# Patient Record
Sex: MALE | Race: WHITE
[De-identification: names, ages, dates, MRNs, and addresses within clinical notes are randomized per-mention and may not be internally consistent; named-entity substitution may affect disease eponyms.]

---

## 2019-08-28 ENCOUNTER — HOSPITAL ENCOUNTER (EMERGENCY)
Dept: HOSPITAL 59 - ER | Age: 39
Discharge: HOME | End: 2019-08-28
Payer: COMMERCIAL

## 2019-08-28 DIAGNOSIS — M54.13: Primary | ICD-10-CM

## 2019-08-28 PROCEDURE — 72072 X-RAY EXAM THORAC SPINE 3VWS: CPT

## 2019-08-28 PROCEDURE — 96372 THER/PROPH/DIAG INJ SC/IM: CPT

## 2019-08-28 PROCEDURE — 99284 EMERGENCY DEPT VISIT MOD MDM: CPT

## 2019-08-28 PROCEDURE — 72040 X-RAY EXAM NECK SPINE 2-3 VW: CPT

## 2019-08-28 NOTE — EMERGENCY DEPARTMENT RECORD
History of Present Illness





- General


Chief Complaint: Back Pain/Injury


Stated Complaint: BACK PAIN LT SHOULDER BLADE,LT HAND NUM/LT TOE NUM


Time Seen by Provider: 08/28/19 21:41


Source: Patient


Mode of Arrival: Ambulatory


Limitations: No limitations





- History of Present Illness


Initial Comments: 





pt has pain in his l upper back for 3 days along the scapula border.  today he 

is getting numbness in his left 4th and 5th fingers. he denies injury but does 

do a lot of heavy lifting at work.


MD Complaint: Back pain


Onset/Timing: 3


-: Days(s)


Similar Symptoms Previously: No


Severity: Mild


Consistency: Constant


Improves With: None


Worsens With: Movement


Associated Symptoms: Numbness





- Related Data


                                  Previous Rx's











 Medication  Instructions  Recorded


 


Albuterol Sulfate [Proair Hfa] 1 - 2 puff IH .EVERY 4-6 HOURS PRN 12/09/18





 #1 inhaler 


 


Cyclobenzaprine HCl [Flexeril] 10 mg PO TID #14 tablet 08/28/19


 


Ibuprofen [Motrin] 800 mg PO Q8H PRN #20 tab 08/28/19











                                    Allergies











Allergy/AdvReac Type Severity Reaction Status Date / Time


 


Egg Derived Allergy  HYPERSENSIT Verified 08/28/19 21:37





   IVITY  


 


lactase [From Dairy Aid] Allergy  HYPERSENSIT Verified 08/28/19 21:37





   IVITY  


 


red dye Allergy  HYPERSENSIT Verified 08/28/19 21:37





   IVITY  


 


gluten AdvReac  ABDOMINAL Verified 08/28/19 21:37





   PAIN  














Travel Screening





- Travel/Exposure Within Last 30 Days


Have you traveled within the last 30 days?: No





- Travel/Exposure Within Last Year


Have you traveled outside the [place] in the last year?: No





- Additonal Travel Details


Have you been exposed to anyone with a communicable illness?: No





- Travel Symptoms


Symptom Screening: None





Review of Systems


Reviewed: No additional complaints except as noted below


Constitutional: Reports: As per HPI.  Denies: Chills, Fever, Malaise, Night 

sweats, Weakness, Weight change


Eyes: Reports: As per HPI.  Denies: Eye discharge, Eye pain, Photophobia, Vision

change


ENT: Reports: As per HPI.  Denies: Congestion, Dental pain, Ear pain, Epistaxis,

Hearing loss, Throat pain


Respiratory: Reports: As per HPI.  Denies: Cough, Dyspnea, Hemoptysis, Stridor, 

Wheezes


Cardiovascular: Reports: As per HPI.  Denies: Arrhythmia, Chest pain, Dyspnea on

exertion, Edema, Murmurs, Orthopnea, Palpitations, Paroxysmal nocturnal dyspnea,

Rheumatic Fever, Syncope


Endocrine: Reports: As per HPI.  Denies: Fatigue, Heat or cold intolerance, 

Polydipsia, Polyuria


Gastrointestinal: Reports: As per HPI.  Denies: Abdominal pain, Constipation, 

Diarrhea, Hematemesis, Hematochezia, Melena, Nausea, Vomiting


Genitourinary: Reports: As per HPI.  Denies: Dysuria, Frequency, Hematuria, 

Incontinence, Retention, Testicular pain, Testicular mass, Urgency


Musculoskeletal: Reports: As per HPI, Back pain.  Denies: Arthralgia, Gout, 

Joint swelling, Myalgia, Neck pain


Skin: Reports: As per HPI.  Denies: Bruising, Change in color, Change in 

hair/nails, Lesions, Pruritus, Rash


Neurological: Reports: As per HPI.  Denies: Abnormal gait, Confusion, Headache, 

Numbness, Paresthesias, Seizure, Tingling, Tremors, Vertigo, Weakness


Psychiatric: Reports: As per HPI.  Denies: Anxiety, Auditory hallucinations, 

Depression, Homicidal thoughts, Suicidal thoughts, Visual hallucinations


Hematological/Lymphatic: Reports: As per HPI.  Denies: Anemia, Blood Clots, Easy

bleeding, Easy bruising, Swollen glands





Past Medical History





- SOCIAL HISTORY


Smoking Status: Never smoker


Alcohol Use: None


Drug Use: None





- RESPIRATORY


Hx Respiratory Disorders: Yes


Hx Asthma: Yes





- CARDIOVASCULAR


Hx Cardio Disorders: No





- NEURO


Hx Neuro Disorders: No





- GI


Hx GI Disorders: No





- 


Hx Genitourinary Disorders: No





- ENDOCRINE


Hx Endocrine Disorders: No





- MUSCULOSKELETAL


Hx Musculoskeletal Disorders: No





- PSYCH


Hx Psych Problems: No





- HEMATOLOGY/ONCOLOGY


Hx Hematology/Oncology Disorders: No





Family Medical History


Any Significant Family History?: No


Hx Heart Disease: Father





Physical Exam





- General


General Appearance: Alert, Oriented x3, Cooperative, Mild distress





- Head


Head exam: Normal inspection





- Eye


Eye exam: Normal appearance, PERRL, EOMI


Pupils: Normal accommodation





- ENT


ENT exam: Normal exam, Mucous membranes moist, Normal external ear exam, Normal 

orophraynx


Ear exam: Normal external inspection.  negative: External canal tenderness


Nasal Exam: Normal inspection.  negative: Discharge, Sinus tenderness


Mouth exam: Normal external inspection, Tongue normal


Teeth exam: Normal inspection.  negative: Dental caries


Throat exam: Normal inspection.  negative: Tonsillar erythema, Tonsillar exudate





- Neck


Neck exam: Normal inspection, Full ROM.  negative: Tenderness





- Respiratory


Respiratory exam: Normal lung sounds bilaterally.  negative: Respiratory 

distress





- Cardiovascular


Cardiovascular Exam: Regular rate, Normal rhythm, Normal heart sounds





- GI/Abdominal


GI/Abdominal exam: Soft, Normal bowel sounds.  negative: Tenderness





- Rectal


Rectal exam: Deferred





- 


 exam: Deferred





- Extremities


Extremities exam: Normal inspection, Full ROM, Normal capillary refill.  

negative: Tenderness





- Back


Back exam: Reports: Normal inspection, Full ROM, Muscle spasm, Paraspinal 

tenderness, Tenderness.  Denies: Rash noted





- Neurological


Neurological exam: Alert, Normal gait, Oriented X3, Reflexes normal





- Psychiatric


Psychiatric exam: Normal affect, Normal mood





- Skin


Skin exam: Dry, Intact, Normal color, Warm





Course





                                   Vital Signs











  08/28/19





  21:29


 


Temperature 97.7 F


 


Pulse Rate [ 72





Pulse Ox Probe] 


 


Respiratory 20





Rate 


 


Blood Pressure 137/92





[Left Arm] 


 


Pulse Ox 99














- Reevaluation(s)


Reevaluation #1: 





08/28/19 23:11


pt still having pain but not as bad.





Disposition


Disposition: Discharge


Clinical Impression: 


Radiculopathy


Qualifiers:


 Spinal region: cervicothoracic Qualified Code(s): M54.13 - Radiculopathy, 

cervicothoracic region





Disposition: Home, Self-Care


Condition: (1) Good


Instructions:  Cervical Radiculopathy (ED)


Additional Instructions: 


follow up with family doctor.  return sooner if worse.  if symptoms continue 

have an mri. no lifting more then 5 pounds for 5 days


Prescriptions: 


Cyclobenzaprine HCl [Flexeril] 10 mg PO TID #14 tablet


Ibuprofen [Motrin] 800 mg PO Q8H PRN #20 tab


 PRN Reason: Pain - Moderate (5-7)


Forms:  Patient Portal Access, Return to Work/School





Quality





- Quality Measures


Quality Measures: N/A





- Blood Pressure Screening


Does Patient Have Any of the Following: No


Blood Pressure Classification: Hypertensive Reading


Systolic Measurement: 137


Diastolic Measurement: 92


Screening for High Blood Pressure: < First Hypertensive BP, F/U Documented > 

[]


First Hypertensive Follow-up Interventions: Follow-up with rescreen GT 1 day and

 LT 4 weeks.

## 2019-08-31 NOTE — RADIOLOGY REPORT
EXAM:  CERVICAL SPINE AP & LATERAL



HISTORY:  LEFT SHOULDER PAIN. 



TECHNIQUE: Frontal, lateral, odontoid. 



COMPARISON: None. 



FINDINGS: Vertebral body height and alignment appears maintained. Spinal lines 
appear maintained. There appears to be mild disc space narrowing at the C7/T1 
disc. No prevertebral soft tissue swelling identified. No radiopaque foreign 
body visualized. 



IMPRESSION:  MILD DISC SPACE NARROWING AT C7/T1. IF CLINICALLY INDICATED, MRI 
COULD STUDY FURTHER. 



JOB NUMBER: [id number]

[facility]

## 2019-09-02 NOTE — RADIOLOGY REPORT
EXAM:  THORACIC SPINE



HISTORY:  UPPER BACK PAIN.



COMPARISON:  None.



TECHNIQUE:  Frontal, lateral, and swimmers. 



FINDINGS:  There is a mild levocurvature of the upper thoracic spine. Vertebral 
body height and alignment otherwise appears maintained. Mild endplate spurring 
noted. Disc space heights appears maintained. Pedicles appear intact, as 
visualized. No adjacent rib fracture visualized.



IMPRESSION:  

MILD LEVOCURVATURE. MILD SPURRING. THE NEED FOR ADDITIONAL IMAGING SHOULD BE 
DETERMINED CLINICALLY. 



JOB NUMBER:  [id number]

[facility]

## 2019-12-04 ENCOUNTER — HOSPITAL ENCOUNTER (EMERGENCY)
Dept: HOSPITAL 59 - ER | Age: 39
Discharge: HOME | End: 2019-12-04
Payer: COMMERCIAL

## 2019-12-04 DIAGNOSIS — G25.0: Primary | ICD-10-CM

## 2019-12-04 LAB
ABSOLUTE NEUTROPHIL COUNT: 3.94
ALBUMIN SERPL-MCNC: 4.4 G/DL (ref 4–5)
ALBUMIN/GLOB SERPL: 2.2 {RATIO} (ref 1.1–1.8)
ALP SERPL-CCNC: 70 U/L (ref 40–129)
ALT SERPL-CCNC: 32 U/L (ref ?–41)
ANION GAP SERPL CALC-SCNC: 12 MMOL/L (ref 7–16)
AST SERPL-CCNC: 26 U/L (ref 10–50)
BASOPHILS NFR BLD: 0.4 % (ref 0–6)
BILIRUB SERPL-MCNC: 0.4 MG/DL (ref 0.2–1)
BUN SERPL-MCNC: 25 MG/DL (ref 6–20)
CO2 SERPL-SCNC: 26 MMOL/L (ref 22–29)
CREAT SERPL-MCNC: 1 MG/DL (ref 0.7–1.2)
EOSINOPHIL NFR BLD: 4.2 % (ref 0–6)
ERYTHROCYTE [DISTWIDTH] IN BLOOD BY AUTOMATED COUNT: 13.6 % (ref 11.5–14.5)
EST GLOMERULAR FILTRATION RATE: > 60 ML/MIN
GLOBULIN SER-MCNC: 2 GM/DL (ref 1.4–4.8)
GLUCOSE SERPL-MCNC: 100 MG/DL (ref 74–109)
GRANULOCYTES NFR BLD: 69.7 % (ref 47–80)
HCT VFR BLD CALC: 43.9 % (ref 42–52)
HGB BLD-MCNC: 14.2 GM/DL (ref 14–18)
LYMPHOCYTES NFR BLD AUTO: 15.8 % (ref 16–45)
MCH RBC QN AUTO: 28.6 PG (ref 27–33)
MCHC RBC AUTO-ENTMCNC: 32.3 G/DL (ref 32–36)
MCV RBC AUTO: 88.5 FL (ref 81–97)
MONOCYTES NFR BLD: 9.9 % (ref 0–9)
PLATELET # BLD: 188 K/UL (ref 130–400)
PMV BLD AUTO: 12 FL (ref 7.4–10.4)
PROT SERPL-MCNC: 6.4 G/DL (ref 6.6–8.7)
RBC # BLD AUTO: 4.96 M/UL (ref 4.4–5.7)
WBC # BLD AUTO: 5.7 K/UL (ref 4.2–12.2)

## 2019-12-04 PROCEDURE — 85025 COMPLETE CBC W/AUTO DIFF WBC: CPT

## 2019-12-04 PROCEDURE — 99283 EMERGENCY DEPT VISIT LOW MDM: CPT

## 2019-12-04 PROCEDURE — 80053 COMPREHEN METABOLIC PANEL: CPT

## 2019-12-04 NOTE — EMERGENCY DEPARTMENT RECORD
History of Present Illness





- General


Chief Complaint: Tremor


Stated Complaint: TREMORS,WEAKNESS IN THIGHS


Time Seen by Provider: 12/04/19 18:36


Source: Patient


Mode of Arrival: Ambulatory


Limitations: No limitations





- History of Present Illness


Initial comments: 





40 yo male presents to ED for evaluation of worsening upper extremity tremor 

over the past several days, reports that he was previously diagnosed with a 

benign tremor disorder previously.  Patient also reports increased stress at 

home, denies fevers, chills, or recent illness.  Patient denies focal weakness 

symptoms on examination.  Patient has not seen an neurologist as of yet, has not

undergone MRI of the brain but did undergo CT imaging of the brain previously 

that appeared "normal".  


-: Days(s)


Location: Left, Upper extremity


Radiation: Non-Radiating


Consistency: Intermittent


Associated Symptoms: Denies other symptoms





- Julian Coma Scale


Eye Response: (4) Open spontaneously


Motor Response: (6) Obeys commands


Verbal Response: (5) Oriented


Julian Total: 15





- Related Data


                                  Previous Rx's











 Medication  Instructions  Recorded


 


Albuterol Sulfate [Proair Hfa] 1 - 2 puff IH .EVERY 4-6 HOURS PRN 12/09/18





 #1 inhaler 











                                    Allergies











Allergy/AdvReac Type Severity Reaction Status Date / Time


 


Egg Derived Allergy  HYPERSENSIT Verified 12/04/19 18:48





   IVITY  


 


lactase [From Dairy Aid] Allergy  HYPERSENSIT Verified 12/04/19 18:48





   IVITY  


 


red dye Allergy  HYPERSENSIT Verified 12/04/19 18:48





   IVITY  


 


gluten AdvReac  ABDOMINAL Verified 12/04/19 18:48





   PAIN  














Review of Systems


Constitutional: Denies: Chills, Fever, Malaise, Night sweats


Eyes: Denies: Eye discharge, Eye pain


ENT: Denies: Congestion, Ear pain, Epistaxis


Respiratory: Denies: Cough, Dyspnea


Cardiovascular: Denies: Chest pain, Dyspnea on exertion


Endocrine: Denies: Fatigue, Heat or cold intolerance


Gastrointestinal: Denies: Abdominal pain, Nausea, Vomiting


Genitourinary: Denies: Incontinence, Retention


Musculoskeletal: Denies: Arthralgia, Back pain


Skin: Denies: Bruising, Change in color


Neurological: Reports: Tremors.  Denies: Abnormal gait, Confusion, Headache, 

Numbness, Paresthesias, Tingling


Psychiatric: Denies: Anxiety


Hematological/Lymphatic: Denies: Anemia, Blood Clots





Past Medical History





- SOCIAL HISTORY


Smoking Status: Never smoker


Drug Use: None





- RESPIRATORY


Hx Respiratory Disorders: Yes


Hx Asthma: Yes





- CARDIOVASCULAR


Hx Cardio Disorders: No





- NEURO


Hx Neuro Disorders: No





- GI


Hx GI Disorders: No





- 


Hx Genitourinary Disorders: No





- ENDOCRINE


Hx Endocrine Disorders: No





- MUSCULOSKELETAL


Hx Musculoskeletal Disorders: No





- PSYCH


Hx Psych Problems: No





- HEMATOLOGY/ONCOLOGY


Hx Hematology/Oncology Disorders: No





Family Medical History


Hx Heart Disease: Father





Physical Exam





- General


General Appearance: Alert, Oriented x3, Cooperative, No acute distress, Other 

(Ambulates with steady gait, no focal weakness on examination.)


Limitations: No limitations





- Head


Head exam: Atraumatic, Normocephalic, Normal inspection


Head exam detail: negative: Abrasion, Contusion, Purdy's sign, General 

tenderness, Hematoma, Laceration





- Eye


Eye exam: Normal appearance.  negative: Conjunctival injection, Periorbital 

swelling, Periorbital tenderness, Scleral icterus





- ENT


Ear exam: negative: Auricular hematoma, Auricular trauma


Nasal Exam: negative: Active bleeding, Discharge, Dried blood, Foreign body


Mouth exam: negative: Drooling, Laceration, Muffled voice, Tongue elevation





- Neck


Neck exam: Normal inspection.  negative: Meningismus, Tenderness





- Respiratory


Respiratory exam: Normal lung sounds bilaterally.  negative: Respiratory 

distress, Rhonchi, Stridor, Wheezes





- Cardiovascular


Cardiovascular Exam: Regular rate, Normal rhythm, Normal heart sounds





- GI/Abdominal


GI/Abdominal exam: Soft.  negative: Distended, Rebound, Rigid, Tenderness





- Rectal


Rectal exam: Deferred





- 


 exam: Deferred





- Extremities


Extremities exam: Other (Mild resting tremor of the LUE, patient is able to 

control the tremor on examination.).  negative: Calf tenderness, Pedal edema, 

Tenderness





- Back


Back exam: Denies: CVA tenderness (R), CVA tenderness (L)





- Neurological


Neurological exam: Alert, Normal gait, Oriented X3





- Psychiatric


Psychiatric exam: Normal affect, Normal mood





- Skin


Skin exam: Normal color.  negative: Abrasion


Type of lesion: negative: abrasion





Course





- Reevaluation(s)


Reevaluation #1: 





12/04/19 19:26


Laboratory studies were reviewed and appear grossly unremarkable for an acute 

process.


Symptoms are likely the result of increased stress, however I did discuss 

obtaining neurology referral for MRI and consultation with the patient through 

his PCP. Patient verbalizes understanding of all instructions and appears stable

for discharge at this time.





Medical Decision Making





- Lab Data


Result diagrams: 


                                 12/04/19 19:01





                                 12/04/19 19:01





Disposition


Disposition: Discharge


Clinical Impression: 


 Benign essential tremor





Disposition: Home, Self-Care


Condition: (2) Stable


Instructions:  Tremors (ED)


Additional Instructions: 


Return to ED if your symptoms worsen or if you have any concerns.


Follow-up with your family doctor in 3-5 days for neurology referral and 

possible MRI of the brain for further evaluation.


Forms:  Patient Portal Access


Time of Disposition: 18:52





Quality





- Quality Measures


Quality Measures: N/A





- Blood Pressure Screening


Does Patient Have Any of the Following: No


Blood Pressure Classification: Pre-Hypertensive BP Reading


Systolic Measurement: 120


Diastolic Measurement: 79


Screening for High Blood Pressure: < Pre-Hypertensive BP, F/U Documented > 

[]


Pre-Hypertensive Follow-up Interventions: Referral to alternative/primary care 

provider.